# Patient Record
Sex: MALE | Race: ASIAN | NOT HISPANIC OR LATINO | Employment: FULL TIME | URBAN - METROPOLITAN AREA
[De-identification: names, ages, dates, MRNs, and addresses within clinical notes are randomized per-mention and may not be internally consistent; named-entity substitution may affect disease eponyms.]

---

## 2019-03-17 ENCOUNTER — HOSPITAL ENCOUNTER (EMERGENCY)
Facility: CLINIC | Age: 29
Discharge: HOME | End: 2019-03-17
Attending: EMERGENCY MEDICINE | Admitting: EMERGENCY MEDICINE
Payer: OTHER GOVERNMENT

## 2019-03-17 VITALS
HEART RATE: 102 BPM | TEMPERATURE: 99.3 F | SYSTOLIC BLOOD PRESSURE: 142 MMHG | RESPIRATION RATE: 16 BRPM | DIASTOLIC BLOOD PRESSURE: 88 MMHG | OXYGEN SATURATION: 97 % | WEIGHT: 170 LBS

## 2019-03-17 DIAGNOSIS — J10.1 INFLUENZA A: ICD-10-CM

## 2019-03-17 LAB
DEPRECATED S PYO AG THROAT QL EIA: NORMAL
FLUAV+FLUBV AG SPEC QL: NEGATIVE
FLUAV+FLUBV AG SPEC QL: POSITIVE
SPECIMEN SOURCE: ABNORMAL
SPECIMEN SOURCE: NORMAL

## 2019-03-17 PROCEDURE — 99283 EMERGENCY DEPT VISIT LOW MDM: CPT

## 2019-03-17 PROCEDURE — 25000132 ZZH RX MED GY IP 250 OP 250 PS 637: Performed by: EMERGENCY MEDICINE

## 2019-03-17 PROCEDURE — 87880 STREP A ASSAY W/OPTIC: CPT | Performed by: EMERGENCY MEDICINE

## 2019-03-17 PROCEDURE — 87081 CULTURE SCREEN ONLY: CPT | Performed by: EMERGENCY MEDICINE

## 2019-03-17 PROCEDURE — 87804 INFLUENZA ASSAY W/OPTIC: CPT | Performed by: EMERGENCY MEDICINE

## 2019-03-17 RX ORDER — ACETAMINOPHEN 500 MG
1000 TABLET ORAL ONCE
Status: COMPLETED | OUTPATIENT
Start: 2019-03-17 | End: 2019-03-17

## 2019-03-17 RX ORDER — IBUPROFEN 600 MG/1
600 TABLET, FILM COATED ORAL EVERY 6 HOURS PRN
Qty: 30 TABLET | Refills: 0 | Status: SHIPPED | OUTPATIENT
Start: 2019-03-17 | End: 2019-04-16

## 2019-03-17 RX ADMIN — ACETAMINOPHEN 1000 MG: 500 TABLET, FILM COATED ORAL at 19:26

## 2019-03-17 ASSESSMENT — ENCOUNTER SYMPTOMS
MYALGIAS: 1
FEVER: 1
COUGH: 1
SHORTNESS OF BREATH: 0
FATIGUE: 1

## 2019-03-17 NOTE — ED AVS SNAPSHOT
[facility] Emergency Department  [address]  [facility] [address]  Phone:  [phone]  Fax:  [phone]                                    Keisha Uribe   MRN: [id number]    Department:  [facility] Emergency Department   Date of Visit:  3/17/2019           After Visit Summary Signature Page    I have received my discharge instructions, and my questions have been answered. I have discussed any challenges I see with this plan with the nurse or doctor.    ..........................................................................................................................................  Patient/Patient Representative Signature      ..........................................................................................................................................  Patient Representative Print Name and Relationship to Patient    ..................................................               ................................................  Date                                   Time    ..........................................................................................................................................  Reviewed by Signature/Title    ...................................................              ..............................................  Date                                               Time          22EPIC Rev 08/18

## 2019-03-18 NOTE — ED PROVIDER NOTES
History     Chief Complaint:  Fever    HPI   Keisha Uribe is a 28 year old male who presents with fever. The patient reports that yesterday he started to experience a dry cough, fever, body aches, sore throat, and fatigue. He took 1 tablet of ibuprofen 12 hours ago. When he falls asleep he can also hear a ringing in his right ear. The patient denies any headache, trouble breathing, difficulty with urination, diarrhea, or any known sick contacts. No shortness of breath or significant chest pain. No rash or skin redness.     Allergies:  No known drug allergies     Medications:    The patient is not currently taking any prescribed medications.     Past Medical History:    Sleep Apnea    Past Surgical History:    Uvula removal   Tonsillectomy    Family History:    History reviewed. No pertinent family history.     Social History:  Smoking Status: Never Smoker  Patient presents alone  Marital Status:  Single     Review of Systems   Constitutional: Positive for fatigue and fever.   HENT: Positive for tinnitus.    Respiratory: Positive for cough. Negative for shortness of breath.    Musculoskeletal: Positive for myalgias.   All other systems reviewed and are negative.      Physical Exam   First Vitals:  Patient Vitals for the past 24 hrs:   BP Temp Temp src Pulse Resp SpO2 Weight   03/17/19 2055 -- -- -- 102 16 97 % --   03/17/19 2046 -- 99.3  F (37.4  C) -- -- -- -- --   03/17/19 1907 142/88 100.7  F (38.2  C) Oral 108 16 95 % 77.1 kg (170 lb)       Physical Exam  General: Well appearing, nontoxic.  Resting comfortably  Head:  Scalp, face, and head appear normal  Eyes:  Pupils are equal, round, and reactive to light    Conjunctivae non-injected and sclerae white  ENT:    The external nose is normal    Pinnae are normal. Bilateral TMs clear without erythema, bulging, or effusion. Auditory canals normal.     The oropharynx is normal, mucous membranes moist    The uvula and tonsils are surgically absent.  Posterior pharynx  is erythematous with cobblestoning no swelling or exudates.  Neck:  Normal range of motion.    There is no rigidity noted    Trachea is in the midline  CV:  Regular rate and rhythm     Normal S1/S2, no S3/S4    No rub. 2/6 systolic murmur LSB.  Resp:  Lungs are clear and equal bilaterally    There is no tachypnea    No increased work of breathing    No rales, wheezing, or rhonchi  GI:  Abdomen is soft, no rigidity or guarding    No distension, or mass    No tenderness or rebound tenderness   MS:  Normal muscular tone    Symmetric motor strength    No lower extremity edema  Skin:  No rash or acute skin lesions noted  Neuro: Awake and alert    Speech is normal and fluent    Moves all extremities spontaneously  Psych:  Normal affect.  Appropriate interactions.     Emergency Department Course     Laboratory:  Influenza A/B antigen: Influenza A positive (A)  Rapid Strep Screen: Negative  Beta strep group A culture: in process    Interventions:  1926 Tylenol 1000 mg oral    Emergency Department Course:  Past medical records, nursing notes, and vitals reviewed.  1917: I performed an exam of the patient and obtained history, as documented above.    Labs were performed.    2044: I rechecked the patient.  Findings and plan explained to the Patient. Patient discharged home with instructions regarding supportive care, medications, and reasons to return. The importance of close follow-up was reviewed.     Impression & Plan      Medical Decision Making:  This patient presents for evaluation of cough and fever as well as body aches and sore throat. This presentation is consistent with influenza. The patient has a normal oxygen saturation and does not have increased work of breathing and therefore a chest x ray is not indicated. Lung sounds are clear bilaterally. He is otherwise healthy, not immunocompromised. No indication for Tamiflu at this time. I recommended supportive care measures at home.  Tylenol, ibuprofen, good PO  intake, good sleep. They understand to return to the ED with new or worse symptoms otherwise follow up in clinic if not improving. At this time there is no sign of serious bacterial infection such as bacteremia, meningitis, UTI/pyelonephritis, strep pharyngitis, etc. Patient agreeable with plan of care. Return precautions were discussed with patient. The patient's questions were answered and the patient was agreeable with discharge.      Diagnosis:    ICD-10-CM    1. Influenza A J10.1        Disposition:  discharged to home    Discharge Medications:     Medication List      Started    acetaminophen 500 MG Caps  2 capsules, Oral, EVERY 8 HOURS PRN, For aches, pain, fever     ibuprofen 600 MG tablet  Commonly known as:  ADVIL/MOTRIN  600 mg, Oral, EVERY 6 HOURS PRN              Susan Driver  3/17/2019   [facility] EMERGENCY DEPARTMENT  I, Susan Driver, am serving as a scribe at 7:17 PM on 3/17/2019 to document services personally performed by Otf Luna MD based on my observations and the provider's statements to me.          Otf Luna MD  03/18/19 9841

## 2019-03-18 NOTE — ED TRIAGE NOTES
Pt in with C/O cough and fever for 24 hours, along with R ear pain. Pt A&Ox4 ABCD's intact. Pt reports he has not taken any medication for his fever

## 2019-03-18 NOTE — ED NOTES
Pt verbalizes understanding of discharge plan, medication, follow up and s/s to return to ER for. Pt discharge ambulating well

## 2019-03-19 LAB
BACTERIA SPEC CULT: NORMAL
SPECIMEN SOURCE: NORMAL

## 2019-03-19 NOTE — RESULT ENCOUNTER NOTE
Final Beta strep group A r/o culture is NEGATIVE for Group A streptococcus.    No treatment or change in treatment per [place] Strep protocol.

## 2023-03-05 ENCOUNTER — HOSPITAL ENCOUNTER (EMERGENCY)
Facility: CLINIC | Age: 33
Discharge: HOME | End: 2023-03-05
Admitting: EMERGENCY MEDICINE
Payer: COMMERCIAL

## 2023-03-05 ENCOUNTER — APPOINTMENT (OUTPATIENT)
Dept: GENERAL RADIOLOGY | Facility: CLINIC | Age: 33
End: 2023-03-05
Attending: EMERGENCY MEDICINE
Payer: COMMERCIAL

## 2023-03-05 VITALS
RESPIRATION RATE: 16 BRPM | DIASTOLIC BLOOD PRESSURE: 85 MMHG | HEART RATE: 82 BPM | TEMPERATURE: 97.4 F | OXYGEN SATURATION: 98 % | SYSTOLIC BLOOD PRESSURE: 136 MMHG

## 2023-03-05 DIAGNOSIS — S93.491A SPRAIN OF ANTERIOR TALOFIBULAR LIGAMENT OF RIGHT ANKLE, INITIAL ENCOUNTER: ICD-10-CM

## 2023-03-05 PROCEDURE — 73610 X-RAY EXAM OF ANKLE: CPT | Mod: RT

## 2023-03-05 PROCEDURE — 250N000013 HC RX MED GY IP 250 OP 250 PS 637: Performed by: EMERGENCY MEDICINE

## 2023-03-05 PROCEDURE — 99283 EMERGENCY DEPT VISIT LOW MDM: CPT

## 2023-03-05 RX ORDER — IBUPROFEN 600 MG/1
600 TABLET, FILM COATED ORAL ONCE
Status: COMPLETED | OUTPATIENT
Start: 2023-03-05 | End: 2023-03-05

## 2023-03-05 RX ADMIN — IBUPROFEN 600 MG: 600 TABLET, FILM COATED ORAL at 16:39

## 2023-03-05 NOTE — ED TRIAGE NOTES
Rolled right ankle Friday morning.  Throbbing, sharp pain since.  Unable to bare weight. Right ankle swelling.  Pedal pulse +2, CMS intact.      Triage Assessment     Row Name 03/05/23 5867       Triage Assessment (Adult)    Airway WDL WDL       Respiratory WDL    Respiratory WDL WDL       Skin Circulation/Temperature WDL    Skin Circulation/Temperature WDL WDL       Cardiac WDL    Cardiac WDL WDL       Peripheral/Neurovascular WDL    Peripheral Neurovascular WDL capillary refill;pulse assessment    Capillary Refill, RLE less than/equal to 3 secs    Pulse Assessment dorsalis pedis       Pulse Dorsalis Pedis    Right Dorsalis Pedis Pulse 2+ (normal)       Cognitive/Neuro/Behavioral WDL    Cognitive/Neuro/Behavioral WDL WDL

## 2023-03-06 NOTE — DISCHARGE INSTRUCTIONS
As discussed, your ankle x-ray is reassuring does not show any obvious evidence of a fracture.  However you did likely sprain your ligament as we discussed, please wear the ankle brace at all times while you have discomfort, and follow-up with your orthopedic team in the next 1 week.  Come back with any other concerns you have, or any other symptoms.  Please do use Tylenol and Motrin around-the-clock as directed on the back of the bottle to help with any pain you have in the next several days

## 2023-03-06 NOTE — ED PROVIDER NOTES
History     Chief Complaint:  Ankle Pain       HPI   Keisha Uribe is a 32 year old male who presents emergency room 2 days after rolling his right ankle.  He states that the pain was not that bad initially and he was able to ambulate on day 1, and then yesterday and today can only ambulate after taking pain medications, and was concerned that the swelling was getting worse.  States pain is over the ATF ligament, no numbness or tingling, otherwise healthy.      Independent Historian:   None - Patient Only    Review of External Notes: n/a       Allergies:  No Known Allergies     Medications:    acetaminophen 500 MG CAPS        Past Medical History:    No past medical history on file.    Past Surgical History:    Past Surgical History:   Procedure Laterality Date     TONSILLECTOMY          Family History:    family history is not on file.    Social History:   reports that he has never smoked. He has never used smokeless tobacco.  PCP: System, Provider Not In     Physical Exam     Patient Vitals for the past 24 hrs:   BP Temp Temp src Pulse Resp SpO2   03/05/23 1636 (!) 146/87 97.4  F (36.3  C) Temporal 100 16 100 %        Physical Exam  Vitals: reviewed by me  General: Pt seen on [facility], cooperative, and alert to conversation  Eyes: Tracking well, clear conjunctiva BL  Resp:  No tachypnea, no accessory muscle use.   MSK: no obvious peripheral edema or joint effusion.    Does have swelling noted to the ATF ligament on the right side, no tenderness to medial or lateral malleolus.  Distal right lower extremity is warm and well-perfused, good pulses, no evidence of trauma apart from swelling to the lateral side noted.  Skin: No rash, normal turgor and temperature  Neuro: Clear speech and no facial droop.      Emergency Department Course     Imaging:  Ankle XR, G/E 3 views, right   Final Result   IMPRESSION: Soft tissue swelling over the medial lateral malleoli without definite underlying fracture.  Ankle mortise is maintained.         Report per radiology      Emergency Department Course & Assessments:    Interventions:  Medications   ibuprofen (ADVIL/MOTRIN) tablet 600 mg (600 mg Oral $Given 3/5/23 7951)            Independent Interpretation (X-rays, CTs, rhythm strip):  Yes, I independently reviewed the ankle x-ray, no obvious fracture noted    Disposition:  The patient was discharged to home.     Impression & Plan        Medical Decision Making:  This is a pleasant 32-year-old male who presents to the emergency room with appears to be an ankle sprain.  No evidence of fracture seen on the x-ray, and no evidence of skin breaks or emergent findings on my exam either.  We will plan for [facility]irc[facility], and outpatient follow-up.  Tylenol and Motrin as needed for pain, red flags for when to come back to the ER discussed in detail    Diagnosis:    ICD-10-CM    1. Sprain of anterior talofibular ligament of right ankle, initial encounter  S93.491A            3/5/2023   No att. providers found        Leo Marcano MD  03/06/23 0016

## 2024-09-15 ENCOUNTER — HOSPITAL ENCOUNTER (EMERGENCY)
Facility: CLINIC | Age: 34
Discharge: HOME | End: 2024-09-15
Attending: PHYSICIAN ASSISTANT | Admitting: PHYSICIAN ASSISTANT
Payer: COMMERCIAL

## 2024-09-15 VITALS
RESPIRATION RATE: 18 BRPM | WEIGHT: 179.9 LBS | OXYGEN SATURATION: 99 % | SYSTOLIC BLOOD PRESSURE: 138 MMHG | HEART RATE: 72 BPM | TEMPERATURE: 98.1 F | DIASTOLIC BLOOD PRESSURE: 92 MMHG | HEIGHT: 71 IN | BODY MASS INDEX: 25.19 KG/M2

## 2024-09-15 DIAGNOSIS — S05.01XA ABRASION OF RIGHT CORNEA, INITIAL ENCOUNTER: ICD-10-CM

## 2024-09-15 DIAGNOSIS — H53.8 BLURRED VISION: ICD-10-CM

## 2024-09-15 PROCEDURE — 90715 TDAP VACCINE 7 YRS/> IM: CPT | Performed by: PHYSICIAN ASSISTANT

## 2024-09-15 PROCEDURE — 250N000011 HC RX IP 250 OP 636: Performed by: PHYSICIAN ASSISTANT

## 2024-09-15 PROCEDURE — 90471 IMMUNIZATION ADMIN: CPT | Performed by: PHYSICIAN ASSISTANT

## 2024-09-15 PROCEDURE — 99283 EMERGENCY DEPT VISIT LOW MDM: CPT | Mod: 25

## 2024-09-15 RX ORDER — ERYTHROMYCIN 5 MG/G
0.5 OINTMENT OPHTHALMIC
Qty: 3.5 G | Refills: 0 | Status: SHIPPED | OUTPATIENT
Start: 2024-09-15 | End: 2024-09-20

## 2024-09-15 RX ORDER — TETRACAINE HYDROCHLORIDE 5 MG/ML
2 SOLUTION OPHTHALMIC ONCE
Status: DISCONTINUED | OUTPATIENT
Start: 2024-09-15 | End: 2024-09-15 | Stop reason: HOSPADM

## 2024-09-15 RX ADMIN — CLOSTRIDIUM TETANI TOXOID ANTIGEN (FORMALDEHYDE INACTIVATED), CORYNEBACTERIUM DIPHTHERIAE TOXOID ANTIGEN (FORMALDEHYDE INACTIVATED), BORDETELLA PERTUSSIS TOXOID ANTIGEN (GLUTARALDEHYDE INACTIVATED), BORDETELLA PERTUSSIS FILAMENTOUS HEMAGGLUTININ ANTIGEN (FORMALDEHYDE INACTIVATED), BORDETELLA PERTUSSIS PERTACTIN ANTIGEN, AND BORDETELLA PERTUSSIS FIMBRIAE 2/3 ANTIGEN 0.5 ML: 5; 2; 2.5; 5; 3; 5 INJECTION, SUSPENSION INTRAMUSCULAR at 17:42

## 2024-09-15 ASSESSMENT — VISUAL ACUITY
OD: OTHER (SEE COMMENTS)
OS: 20/50;WITHOUT CORRECTIVE LENSES

## 2024-09-15 ASSESSMENT — ACTIVITIES OF DAILY LIVING (ADL)
ADLS_ACUITY_SCORE: 35
ADLS_ACUITY_SCORE: 33

## 2024-09-15 ASSESSMENT — COLUMBIA-SUICIDE SEVERITY RATING SCALE - C-SSRS
6. HAVE YOU EVER DONE ANYTHING, STARTED TO DO ANYTHING, OR PREPARED TO DO ANYTHING TO END YOUR LIFE?: NO
1. IN THE PAST MONTH, HAVE YOU WISHED YOU WERE DEAD OR WISHED YOU COULD GO TO SLEEP AND NOT WAKE UP?: NO
2. HAVE YOU ACTUALLY HAD ANY THOUGHTS OF KILLING YOURSELF IN THE PAST MONTH?: NO

## 2024-09-15 NOTE — ED PROVIDER NOTES
"  Emergency Department Note      History of Present Illness     Chief Complaint   Eye Pain      HPI   Keisha Uribe is a 33 year old male Who presents for evaluation of right eye pain and foreign body sensation.  The patient states he woke up this morning and felt like there was something in his right eye.  He has had some tearing from it since.  Reports discomfort and fb sensation.  No floaters, visual field cuts, curtain sensation, fever,   He does not wear contacts.  He denies any known injury to the eye.  He has not had any purulent drainage from the eye.  He feels like his vision is little bit blurry.  No vomiting or history of recent eye surgeries.  He states he was seen at urgent care but they could not find anything wrong with him and he was referred here for further eval.     Independent Historian   None    Review of External Notes   none    Past Medical History     Medical History and Problem List   No past medical history on file.    Medications   erythromycin (ROMYCIN) 5 MG/GM ophthalmic ointment  acetaminophen 500 MG CAPS        Surgical History   Past Surgical History:   Procedure Laterality Date    TONSILLECTOMY         Physical Exam     Patient Vitals for the past 24 hrs:   BP Temp Temp src Pulse Resp SpO2 Height Weight   09/15/24 1453 (!) 138/92 98.1  F (36.7  C) Oral 72 18 99 % 1.803 m (5' 11\") 81.6 kg (179 lb 14.3 oz)     Physical Exam  General: Well appearing.  No acute distress.  HENT:  Scalp AT/NC. No facial swelling, redness, bruising or deformity.  Eyes:  PEERL, Extra occular movements intact without pain.  No proptosis.    Conjunctiva are normal appearing. Clear tearing. No lid swelling, crusting or discharge.    No foreign bodies present with eversion of lids.  Exam of eye with fluoroscein dye reveals small horizontal area of increased uptake at 6 o'clock position consistant w/corneal abrasion.  Negative seidels sign.  No dendritic lesions, ulcers. Anterior chamber appears grossly clear " w/out hyphema, hypopion, or significant flare. Relief w/tetracaine.  No consensual photophobia.    Right Eye 20/400 (reports unable to read chart but can easily count fingers across room w/left eye covered).     Left Eye 20/50    IOP 13, 14  mmHg R eye on two measurements.  CV:  Regular rate and rhythm    No pathologic murmur, rubs, or gallops.  Resp:  Breath sounds are clear bilaterally.   Non-labored.  Neuro:  Alert and Oriented.    GCS: 15    CN II-XII grossly intact. 5/5 strength BL in upper and lower extremities.  Normal finger to nose and heel to shin testing.  Skin:  No rashes or lesions.  Psych:  alert, appropriate interactions. Cooperative      Diagnostics     Independent Interpretation   None    ED Course      Medications Administered   Medications   tetracaine (PONTOCAINE) 0.5 % ophthalmic solution 2 drop (has no administration in time range)   fluorescein (FUL-ANNA) ophthalmic strip 1 strip (has no administration in time range)   Tdap (tetanus-diphtheria-acell pertussis) (ADACEL) injection 0.5 mL (0.5 mLs Intramuscular $Given 9/15/24 3565)       Procedures   Procedures     Discussion of Management   None    ED Course        Additional Documentation  None    Medical Decision Making / Diagnosis     CMS Diagnoses: None    MIPS       None    MDM   Patient presents with right eye pain, fb sensation, and blurred vision. Broad differential was considered.  The exam is significant for abnormality on fluorescein exam. This is consistent with corneal abrasion. No foreign bodies in eyes or lids noted.  No evidence of ruptured globe, intra-occular foreign body, hyphema, retro-bulbar hematoma, orbital fracture, lens dislocation, retinal detachment, or traumatic iritis.  No evidence of other serious non-traumatic eye disease such as corneal ulcer, dendritic lesions, endopthalmitis, angle closure glaucoma, etc.   very low suspicion clinically for more serious pathology such as retinal detachment, orbital cellulitis,  vitreous hemorrhage, optic neuritis, central retinal or artery/vein occlusion, tumor, stroke etc. given relief of discomfort with tetracaine etc.    Patient will be placed on antibiotic drops as below, given that he does report his vision is fairly blurred somewhat more than would be expected for this abrasion I am going to place an urgent referral to ophthalmology for him to be seen.  I stressed that if his symptoms get worse he should go to the [facility] ER for further evaluation and emergent Optho consult.  I do not think advanced imaging is indicated at this time.  Discussed symptomatic treatment for pain control. Patient was given follow-up for ophthalmology, with instruction to schedule an appointment to be seen in 1-2 days.  Return to ED if new or worsening symptoms.      Disposition   The patient was discharged.     Diagnosis     ICD-10-CM    1. Abrasion of right cornea, initial encounter  S05.01XA Adult Eye  Referral     CANCELED: Adult Eye  Referral      2. Blurred vision  H53.8 Adult Eye  Referral           Discharge Medications   Discharge Medication List as of 9/15/2024  5:43 PM        START taking these medications    Details   erythromycin (ROMYCIN) 5 MG/GM ophthalmic ointment Place 0.5 inches into the right eye 5 times daily for 5 days.Disp-3.5 g, [id number]                  Saulo Castro PA-C  09/15/24 1253

## 2024-09-15 NOTE — ED TRIAGE NOTES
Patient states he woke today with pain in his right eye and a feeling as if there was something in his eye. Patient went to urgent care and they are unable to find anything wrong with his eye. Patient states it is difficult to open his eye due to pain.      Triage Assessment (Adult)       Row Name 09/15/24 1452          Triage Assessment    Airway WDL WDL        Respiratory WDL    Respiratory WDL WDL        Skin Circulation/Temperature WDL    Skin Circulation/Temperature WDL WDL        Cardiac WDL    Cardiac WDL WDL        Peripheral/Neurovascular WDL    Peripheral Neurovascular WDL WDL

## 2024-09-18 ENCOUNTER — OFFICE VISIT (OUTPATIENT)
Dept: OPTOMETRY | Facility: CLINIC | Age: 34
End: 2024-09-18
Attending: PHYSICIAN ASSISTANT
Payer: COMMERCIAL

## 2024-09-18 DIAGNOSIS — H53.8 BLURRED VISION: Primary | ICD-10-CM

## 2024-09-18 DIAGNOSIS — H52.13 MYOPIA OF BOTH EYES: ICD-10-CM

## 2024-09-18 DIAGNOSIS — S05.01XA ABRASION OF RIGHT CORNEA, INITIAL ENCOUNTER: ICD-10-CM

## 2024-09-18 PROCEDURE — 92015 DETERMINE REFRACTIVE STATE: CPT | Performed by: OPTOMETRIST

## 2024-09-18 PROCEDURE — 92004 COMPRE OPH EXAM NEW PT 1/>: CPT | Performed by: OPTOMETRIST

## 2024-09-18 ASSESSMENT — REFRACTION_MANIFEST
OD_SPHERE: -2.00
OD_AXIS: 028
OS_CYLINDER: SPHERE
METHOD_AUTOREFRACTION: 1
OD_CYLINDER: +0.25
OS_SPHERE: -1.25

## 2024-09-18 ASSESSMENT — CONF VISUAL FIELD
OS_INFERIOR_NASAL_RESTRICTION: 0
OS_INFERIOR_TEMPORAL_RESTRICTION: 0
OD_INFERIOR_TEMPORAL_RESTRICTION: 0
OS_NORMAL: 1
OD_SUPERIOR_TEMPORAL_RESTRICTION: 0
OD_NORMAL: 1
OS_SUPERIOR_NASAL_RESTRICTION: 0
OS_SUPERIOR_TEMPORAL_RESTRICTION: 0
OD_INFERIOR_NASAL_RESTRICTION: 0
OD_SUPERIOR_NASAL_RESTRICTION: 0

## 2024-09-18 ASSESSMENT — VISUAL ACUITY
OD_SC: 20/80
METHOD: SNELLEN - LINEAR
OS_SC+: -2
OD_PH_SC: 20/25
OS_SC: 20/40

## 2024-09-18 ASSESSMENT — SLIT LAMP EXAM - LIDS
COMMENTS: NORMAL
COMMENTS: NORMAL

## 2024-09-18 ASSESSMENT — CUP TO DISC RATIO
OD_RATIO: 0.45
OS_RATIO: 0.45

## 2024-09-18 ASSESSMENT — TONOMETRY
IOP_METHOD: APPLANATION
OS_IOP_MMHG: 14
OD_IOP_MMHG: 13

## 2024-09-18 ASSESSMENT — EXTERNAL EXAM - LEFT EYE: OS_EXAM: NORMAL

## 2024-09-18 ASSESSMENT — EXTERNAL EXAM - RIGHT EYE: OD_EXAM: NORMAL

## 2024-09-18 NOTE — PROGRESS NOTES
Chief Complaint   Patient presents with    Corneal Abrasion Follow Up   Patient woke up 3 days ago with pain and foreign body sensation in his right eye. No history of foreign body or trauma.   He went to the ER and was told it was an abrasion and was put on eye ointment.He last used this last night and has been using it 5 times daily. He states the pain is getting better but now feels the the right eye is getting more blurry than it used to be.    Does not wear glasses or contacts    Do you wear contact lenses? No    KEYON Do - Optometric Assistant      See Review Of Systems       Medical, surgical and family histories reviewed and updated 9/18/2024.         OBJECTIVE: See Ophthalmology exam    ASSESSMENT:    ICD-10-CM    1. Blurred vision  H53.8 Adult Eye  Referral     EYE EXAM (SIMPLE-NONBILLABLE)      2. Abrasion of right cornea, initial encounter  S05.01XA Adult Eye  Referral     EYE EXAM (SIMPLE-NONBILLABLE)      3. Myopia of both eyes  H52.13 REFRACTION       Corneal completely clear, likely was blurry before, but did not notice until visual acuity checked  He was having trouble w/ night driving     PLAN:  Patient states no vision plan, went ahead with full exam, defer dilation until prescription obtained   Can go off abx ointment use at night for a couple of nights  Then switch to artificial tears  as needed   Artificial tears  as needed for dryness  Distance prescription given  Return to clinic as needed or one year for routine eye exam

## 2024-09-18 NOTE — LETTER
9/18/2024      Keisha Uribe  [address]  [facility] [address]      Dear Colleague,    [facility] you [facility]. Please see a copy of my visit note below.    Chief Complaint   Patient presents with     Corneal Abrasion Follow Up   Patient woke up 3 days ago with pain and foreign body sensation in his right eye. No history of foreign body or trauma.   He went to the ER and was told it was an abrasion and was put on eye ointment.He last used this last night and has been using it 5 times daily. He states the pain is getting better but now feels the the right eye is getting more blurry than it used to be.    Does not wear glasses or contacts    Do you wear contact lenses? No    KEYON Do - Optometric Assistant      See Review Of Systems       Medical, surgical and family histories reviewed and updated 9/18/2024.         OBJECTIVE: See Ophthalmology exam    ASSESSMENT:    ICD-10-CM    1. Blurred vision  H53.8 Adult Eye  Referral     EYE EXAM (SIMPLE-NONBILLABLE)      2. Abrasion of right cornea, initial encounter  S05.01XA Adult Eye  Referral     EYE EXAM (SIMPLE-NONBILLABLE)      3. Myopia of both eyes  H52.13 REFRACTION       Corneal completely clear, likely was blurry before, but did not notice until visual acuity checked  He was having trouble w/ night driving     PLAN:  Can go off abx ointment use at night for a couple of nights  Then switch to artificial tears  as needed   Artificial tears  as needed for dryness  Distance prescription given  Return to clinic as needed or one year for routine eye exam     Again, thank you for allowing me to participate in the care of your patient.        Sincerely,        Mary Thapa, OD